# Patient Record
Sex: FEMALE
[De-identification: names, ages, dates, MRNs, and addresses within clinical notes are randomized per-mention and may not be internally consistent; named-entity substitution may affect disease eponyms.]

---

## 2024-08-12 ENCOUNTER — CARE COORDINATION (OUTPATIENT)
Dept: OTHER | Facility: CLINIC | Age: 89
End: 2024-08-12

## 2024-08-12 NOTE — CARE COORDINATION
Ambulatory Care Coordination Note     8/12/2024 3:59 PM     ACM outreach attempt by this ACM today to offer care management services. ACM was unable to reach the patient by telephone today; left voice message requesting a return phone call to this ACM.     ACM: Etienne Fraser RN     Care Summary Note: ACM attempted to reach patient for initial follow up after recent ED utilization. HIPAA compliant voicemail message left with request for return call at patient's convenience. Will continue to outreach.    PCP/Specialist follow up:     Follow-up Information       Follow up With Specialties Details Why Contact Info    Holly Yañez NP-C PCP Follow up on 8/12/2024  [address]  [address]  [facility] [id number] [phone]      Trevon Fournier MD Urology Follow up on 10/7/2024  [address] [facility] [id number] [phone]       Frances Carrizales MD Rheumatology Follow up 11/29/24  [address] [facility]   [address]   [place] [address] [phone] (Work)   [phone] (Fax)                   Follow Up:   Plan for next ACM outreach in approximately 1-2 days  to complete:  - outreach attempt to offer care management services.     PEGGY Clemente RN  Ambulatory Care Manager  Phone: [phone]  Email: [email or web address]

## 2024-08-15 ENCOUNTER — CARE COORDINATION (OUTPATIENT)
Dept: OTHER | Facility: CLINIC | Age: 89
End: 2024-08-15

## 2024-08-15 NOTE — CARE COORDINATION
Ambulatory Care Coordination Note     8/15/2024 9:10 AM     Patient outreach attempt by this ACM today to offer care management services. ACM was unable to reach the patient by telephone today; left voice message requesting a return phone call to this ACM.     ACM: Etienne Fraser RN     Care Summary Note: ACM attempted to reach patient for ED follow up. HIPAA compliant voicemail message left with request for return call at patient's convenience.     PCP/Specialist follow up:   Future Appointments         Provider Specialty Dept Phone    11/29/2024 10:00 AM Dr. Frances Carrizales MD Rheumatology           Follow-up Information       Follow up With Specialties Details Why Contact Info    Trevon Fournier MD Urology Follow up on 10/7/2024  [address] [facility] [id number] [phone]              Follow Up:   Plan for next AC outreach in approximately 1-2 days  to complete:  - outreach attempt to offer care management services.     PEGGY Clemente RN  Ambulatory Care Manager  Phone: [phone]  Email: [email or web address]

## 2024-08-19 ENCOUNTER — CARE COORDINATION (OUTPATIENT)
Dept: OTHER | Facility: CLINIC | Age: 89
End: 2024-08-19

## 2024-08-19 NOTE — CARE COORDINATION
Ambulatory Care Coordination Note     8/19/2024 2:44 PM     patient outreach attempt by this ACM today to offer care management services. ACM was unable to reach the patient by telephone today; left voice message requesting a return phone call to this ACM.     Patient closed (unable to reach patient) from the High Risk Care Management program on 8/19/2024.  No further Ambulatory Care Manager follow up scheduled.     PEGGY Clemente RN  Ambulatory Care Manager  Phone: [phone]  Email: [email or web address]

## 2024-12-12 ENCOUNTER — CARE COORDINATION (OUTPATIENT)
Dept: OTHER | Facility: CLINIC | Age: 88
End: 2024-12-12

## 2024-12-12 NOTE — CARE COORDINATION
Care Transitions Note    Initial Call - Call within 2 business days of discharge: Yes    Attempted to reach patient for transitions of care follow up. Unable to reach patient.    Outreach Attempts:   Unable to leave message.     Patient: Dilcia Zayas    Patient : 1935   MRN: [id number]    Reason for Admission: new onset afib, sepsis  Discharge Date:    RURS: No data recorded      Was this an external facility discharge? Yes. Discharge Date: 24. Facility Name: [facility]        Plan for follow-up on next business day.      Loreta Reed RN

## 2024-12-13 ENCOUNTER — CARE COORDINATION (OUTPATIENT)
Dept: OTHER | Facility: CLINIC | Age: 88
End: 2024-12-13

## 2024-12-13 NOTE — CARE COORDINATION
Care Transitions Note    Initial Call - Call within 2 business days of discharge: Yes    Attempted to reach patient for transitions of care follow up. Unable to reach patient.    Outreach Attempts:   Unable to leave message.     Patient: Dilcia Zayas    Patient : 1935   MRN: [id number]    Reason for Admission: new onset afib, sepsis  Discharge Date:    RURS: No data recorded      Was this an external facility discharge? Yes. Discharge Date: 24. Facility Name: [facility]    Follow Up Appointment:   Patient does not have a follow up appointment scheduled at time of call. Prefers to self-schedule NADIA appointment. As per care manager note from [facility].       Plan for follow-up call in 6-10 days     Loreta Reed RN

## 2024-12-20 ENCOUNTER — CARE COORDINATION (OUTPATIENT)
Dept: OTHER | Facility: CLINIC | Age: 88
End: 2024-12-20

## 2024-12-20 NOTE — CARE COORDINATION
Care Transitions Note    Initial Call - Call within 2 business days of discharge: No    Attempted to reach patient for transitions of care follow up.  Unable to reach patient.      Outreach Attempts:   Multiple attempts to contact patient at phone numbers on file.   Unable to leave message.   No other phone numbers on file to attempt     Patient closed (unable to reach patient) from the Care Transitions program on 12/20/24. CTN will sign off.       Handoff:   Patient was not referred to the ACM team due to unable to contact patient.          Loreta Reed RN